# Patient Record
Sex: MALE | Race: WHITE | URBAN - METROPOLITAN AREA
[De-identification: names, ages, dates, MRNs, and addresses within clinical notes are randomized per-mention and may not be internally consistent; named-entity substitution may affect disease eponyms.]

---

## 2018-09-28 ENCOUNTER — APPOINTMENT (OUTPATIENT)
Dept: CT IMAGING | Age: 63
End: 2018-09-28
Payer: MEDICARE

## 2018-09-28 ENCOUNTER — HOSPITAL ENCOUNTER (EMERGENCY)
Age: 63
Discharge: HOME | End: 2018-09-29
Payer: MEDICARE

## 2018-09-28 DIAGNOSIS — S01.81XA LACERATION OF FOREHEAD, INITIAL ENCOUNTER: ICD-10-CM
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
## 2018-09-28 DIAGNOSIS — F10.920 ACUTE ALCOHOLIC INTOXICATION WITHOUT COMPLICATION (HCC): Primary | ICD-10-CM

## 2018-09-28 DIAGNOSIS — S09.90XA CLOSED HEAD INJURY, INITIAL ENCOUNTER: ICD-10-CM

## 2018-09-28 LAB — ETHANOL: 276 MG/DL (ref 0–0.08)

## 2018-09-28 PROCEDURE — 2500000003 HC RX 250 WO HCPCS: Performed by: PHYSICIAN ASSISTANT

## 2018-09-28 PROCEDURE — 99284 EMERGENCY DEPT VISIT MOD MDM: CPT

## 2018-09-28 PROCEDURE — 70450 CT HEAD/BRAIN W/O DYE: CPT

## 2018-09-28 PROCEDURE — G0480 DRUG TEST DEF 1-7 CLASSES: HCPCS

## 2018-09-28 PROCEDURE — 4500000024 HC ED LEVEL 4 PROCEDURE

## 2018-09-28 PROCEDURE — 72125 CT NECK SPINE W/O DYE: CPT

## 2018-09-28 RX ORDER — NICOTINE 21 MG/24HR
1 PATCH, TRANSDERMAL 24 HOURS TRANSDERMAL ONCE
Status: DISCONTINUED | OUTPATIENT
Start: 2018-09-28 | End: 2018-09-29 | Stop reason: HOSPADM

## 2018-09-28 RX ORDER — LIDOCAINE HYDROCHLORIDE AND EPINEPHRINE 10; 10 MG/ML; UG/ML
20 INJECTION, SOLUTION INFILTRATION; PERINEURAL ONCE
Status: COMPLETED | OUTPATIENT
Start: 2018-09-28 | End: 2018-09-28

## 2018-09-28 RX ADMIN — LIDOCAINE HYDROCHLORIDE AND EPINEPHRINE 20 ML: 10; 10 INJECTION, SOLUTION INFILTRATION; PERINEURAL at 21:50

## 2018-09-28 ASSESSMENT — PAIN SCALES - GENERAL
PAINLEVEL_OUTOF10: 7
PAINLEVEL_OUTOF10: 7

## 2018-09-28 ASSESSMENT — PAIN DESCRIPTION - LOCATION: LOCATION: GENERALIZED

## 2018-09-29 VITALS
OXYGEN SATURATION: 99 % | SYSTOLIC BLOOD PRESSURE: 121 MMHG | HEIGHT: 67 IN | WEIGHT: 160 LBS | RESPIRATION RATE: 16 BRPM | HEART RATE: 83 BPM | BODY MASS INDEX: 25.11 KG/M2 | TEMPERATURE: 97.8 F | DIASTOLIC BLOOD PRESSURE: 74 MMHG

## 2018-09-29 PROCEDURE — 6360000002 HC RX W HCPCS: Performed by: EMERGENCY MEDICINE

## 2018-09-29 RX ORDER — ONDANSETRON 4 MG/1
4 TABLET, ORALLY DISINTEGRATING ORAL ONCE
Status: COMPLETED | OUTPATIENT
Start: 2018-09-29 | End: 2018-09-29

## 2018-09-29 RX ADMIN — ONDANSETRON 4 MG: 4 TABLET, ORALLY DISINTEGRATING ORAL at 03:31

## 2018-09-29 ASSESSMENT — ENCOUNTER SYMPTOMS
BACK PAIN: 0
EYE DISCHARGE: 0
NAUSEA: 0
FACIAL SWELLING: 0
CHOKING: 0
APNEA: 0
ABDOMINAL PAIN: 0
SHORTNESS OF BREATH: 0
EYE REDNESS: 0
VOMITING: 0

## 2018-09-29 ASSESSMENT — PAIN SCALES - GENERAL: PAINLEVEL_OUTOF10: 5

## 2018-09-29 NOTE — ED NOTES
Bed: B-04  Expected date: 9/28/18  Expected time: 8:08 PM  Means of arrival: [facility]  Comments:  13T Head Lac intoxicated     Coleen Parrish [place], [address] [facility]  09/28/18 2013

## 2018-09-29 NOTE — ED PROVIDER NOTES
Genitourinary: Negative for dysuria. Musculoskeletal: Negative for back pain, neck pain and neck stiffness. Neurological: Negative for dizziness, tremors, seizures and headaches. All other systems reviewed and are negative. Positives and Pertinent negatives as per HPI. Except as noted above in the ROS, problem specific ROS was completed and is negative. Physical Exam:  Physical Exam   Constitutional: He is oriented to person, place, and time. He appears well-developed and well-nourished. HENT:   Head: Normocephalic. Head is with laceration. Nose: Nose normal.   Mouth/Throat: Oropharynx is clear and moist.   Eyes: Pupils are equal, round, and reactive to light. Conjunctivae and EOM are normal. Right eye exhibits no discharge. Left eye exhibits no discharge. Neck: Normal range of motion. Neck supple. Cardiovascular: Normal rate, regular rhythm and normal heart sounds. Exam reveals no gallop and no friction rub. No murmur heard. Pulmonary/Chest: Effort normal and breath sounds normal. No respiratory distress. He has no wheezes. He has no rales. He exhibits no tenderness. Abdominal: Soft. Bowel sounds are normal. He exhibits no distension and no mass. There is no tenderness. There is no rebound and no guarding. Musculoskeletal: Normal range of motion. Neurological: He is alert and oriented to person, place, and time. Skin: Skin is warm and dry. He is not diaphoretic. Psychiatric: He has a normal mood and affect. His behavior is normal.   Nursing note and vitals reviewed.       MEDICAL DECISION MAKING    Vitals:    Vitals:    09/28/18 2014   BP: 136/67   Pulse: 87   Resp: 16   Temp: 97.6 °F (36.4 °C)   TempSrc: Oral   SpO2: 97%   Height: 5' 7\" (1.702 m)       LABS:   Labs Reviewed   ETHANOL    Narrative:     Performed at:  [facility]  [address] [place], [place] [address]   Phone [phone]        Remainder of labs reviewed and were Unchanged appearance compared with prior study. 2. Left forehead laceration, contusion and small scalp hematoma. 3. Chronic mild ventriculomegaly which can be seen with normal pressure hydrocephalus, but is nonspecific and may simply be the result of cerebral cortical atrophy. 4.  White matter hypoattenuation described is typical of microvascular ischemic disease or as sequela of dysmyelinating/demyelinating processes. 5. Degenerative changes involving the cervical spine described above. 6. No cervical fracture or acute traumatic subluxation. Unchanged appearance compared to previous study. 7. Emphysema. Note that if pain persists or worsens, or if clinically there is concern for CT occult acute cervical abnormality, flexion/extension C-spine series or MRI cervical spine may be considered for additional evaluation. MEDICAL DECISION MAKING / ED COURSE:      PROCEDURES:   Procedures    This is a procedure note on a forehead laceration repair. Patient is 7 cm stellate laceration. Area was irrigated with a copious amount of normal saline. He received a total of 7 mL so lidocaine with epi. After complete anesthetic wound was explored. It did not lacerate down to the galea. No foreign body was noted. He was reirrigated. Wound was dried. He was then sterilely draped sterile towels. Patient then received a total of 13 5-0 nylon sutures in interrupted manner. Wound edges came together very well. Patient was given:  Medications   nicotine (NICODERM CQ) 14 MG/24HR 1 patch (not administered)   lidocaine-EPINEPHrine 1 percent-1:[id number] injection 20 mL (20 mLs Intradermal Given 9/28/18 2150)       Emergency room course: Patient on exam pupils equal round and reactive to light and cycle movements intact. Patient has no midline tenderness cervical thoracic or lumbar spine but a c-collar was placed due to the patient intoxication. He does have a 7 cm stellate laceration to the left side for head.  Cardiovascular regular

## 2018-09-29 NOTE — ED NOTES
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 Pt unable to follow directions.      Kaylan Davila RN  09/28/18 2031

## 2018-09-29 NOTE — ED NOTES
Pt arrives to ER on backboard and collar after falling. Unsure of LOC. Laceration to forehead. Pt admits to drinking. Pt pulling c-collar off.      Lamond Holstein, RN  09/28/18 2027
